# Patient Record
Sex: MALE | Race: BLACK OR AFRICAN AMERICAN | NOT HISPANIC OR LATINO | URBAN - NONMETROPOLITAN AREA
[De-identification: names, ages, dates, MRNs, and addresses within clinical notes are randomized per-mention and may not be internally consistent; named-entity substitution may affect disease eponyms.]

---

## 2022-01-25 ENCOUNTER — NEW PATIENT (OUTPATIENT)
Dept: URBAN - NONMETROPOLITAN AREA CLINIC 1 | Facility: CLINIC | Age: 57
End: 2022-01-25

## 2022-01-25 DIAGNOSIS — H52.4: ICD-10-CM

## 2022-01-25 PROCEDURE — S0620 ROUTINE OPHTHALMOLOGICAL EXA: HCPCS

## 2022-01-25 ASSESSMENT — TONOMETRY
OD_IOP_MMHG: 15
OS_IOP_MMHG: 15

## 2022-01-25 ASSESSMENT — VISUAL ACUITY
OD_SC: 20/50-
OD_PH: 20/40
OS_SC: 20/50-2

## 2022-01-25 NOTE — PATIENT DISCUSSION
Discussed diagnosis in detail with patient's mother. Would like to bring patient back for testing. Continue to monitor.

## 2022-01-25 NOTE — PATIENT DISCUSSION
Discussed diagnosis in detail with patient, recommend cataract eval with Dr. Alfonso Flynn but would like to do BL GL testing first. Patient's mother agrees with plan.

## 2022-02-15 ENCOUNTER — FOLLOW UP (OUTPATIENT)
Dept: URBAN - NONMETROPOLITAN AREA CLINIC 1 | Facility: CLINIC | Age: 57
End: 2022-02-15

## 2022-02-15 DIAGNOSIS — H40.1234: ICD-10-CM

## 2022-02-15 PROCEDURE — 92133 CPTRZD OPH DX IMG PST SGM ON: CPT

## 2022-02-15 PROCEDURE — 99214 OFFICE O/P EST MOD 30 MIN: CPT

## 2022-02-15 PROCEDURE — 76514 ECHO EXAM OF EYE THICKNESS: CPT

## 2022-02-15 ASSESSMENT — VISUAL ACUITY
OS_SC: 20/40
OD_SC: 20/40

## 2022-02-15 ASSESSMENT — TONOMETRY
OD_IOP_MMHG: 16
OS_IOP_MMHG: 16

## 2022-02-15 ASSESSMENT — PACHYMETRY
OS_CT_UM: 507
OD_CT_UM: 497

## 2022-02-15 NOTE — PATIENT DISCUSSION
Discussed diagnosis in detail with patient, recommend cataract eval with Dr. Lilia Gonzalez but would like to do BL GL testing first. Patient's mother agrees with plan.

## 2022-02-15 NOTE — PATIENT DISCUSSION
Discussed findings in detail w/ pt today. Signs/symptoms associated with progression discussed, will start patient on Latanoprost QHS OU. RX sent to pharmacy. Continue to monitor.

## 2022-03-16 ENCOUNTER — CONSULTATION/EVALUATION (OUTPATIENT)
Dept: URBAN - NONMETROPOLITAN AREA CLINIC 1 | Facility: CLINIC | Age: 57
End: 2022-03-16

## 2022-03-16 DIAGNOSIS — H25.13: ICD-10-CM

## 2022-03-16 PROCEDURE — 99214 OFFICE O/P EST MOD 30 MIN: CPT

## 2022-03-16 ASSESSMENT — VISUAL ACUITY
OD_BAT: 20/40-1
OS_PH: 20/25-2
OS_BAT: 20/40-1
OD_SC: 20/30
OD_PAM: 20/70
OS_SC: 20/30-1
OD_PH: 20/25-2
OS_SC: 20/100
OD_SC: 20/100
OS_AM: 20/50

## 2022-03-16 ASSESSMENT — TONOMETRY
OD_IOP_MMHG: 16
OS_IOP_MMHG: 16

## 2022-03-16 NOTE — PATIENT DISCUSSION
(Surgical) Visually Significant (secondary to glare), discussed the risks, benefits, alternatives, and limitations of cataract surgery. The patient stated a full understanding and a desire to proceed with the procedure. The patient will need to return for pre-op appointment with cataract measurements and to have any additional questions answered, and start pre-operative eye drops as directed. Pt elects to proceed with Stand/Trad OU starting with OD first and then OS after. Dextenza OU + Discussed need for bifocals.

## 2022-03-16 NOTE — PATIENT DISCUSSION
Discussed diagnosis in detail with patient, recommend cataract eval with Dr. Batool Ricardo but would like to do BL GL testing first. Patient's mother agrees with plan.

## 2022-04-20 ENCOUNTER — PRE-OP/H&P (OUTPATIENT)
Dept: URBAN - NONMETROPOLITAN AREA CLINIC 1 | Facility: CLINIC | Age: 57
End: 2022-04-20

## 2022-04-20 VITALS
HEART RATE: 72 BPM | DIASTOLIC BLOOD PRESSURE: 94 MMHG | WEIGHT: 185 LBS | BODY MASS INDEX: 30.82 KG/M2 | SYSTOLIC BLOOD PRESSURE: 127 MMHG | HEIGHT: 65 IN

## 2022-04-20 DIAGNOSIS — Z01.818: ICD-10-CM

## 2022-04-20 PROCEDURE — 99499 UNLISTED E&M SERVICE: CPT

## 2022-04-20 ASSESSMENT — VISUAL ACUITY
OD_BAT: 20/40-1
OD_SC: 20/30
OD_PH: 20/25-2
OS_BAT: 20/40-1
OS_SC: 20/100
OD_SC: 20/100
OS_PH: 20/25-2
OD_PAM: 20/70
OS_SC: 20/30-1
OS_AM: 20/50

## 2022-04-20 NOTE — PATIENT DISCUSSION
Medical Clearance done today. No outstanding medical concerns that would preclude surgery. Patient is cleared for surgery.

## 2022-05-10 ENCOUNTER — POST-OP (OUTPATIENT)
Dept: URBAN - NONMETROPOLITAN AREA CLINIC 1 | Facility: CLINIC | Age: 57
End: 2022-05-10

## 2022-05-10 ENCOUNTER — SURGERY/PROCEDURE (OUTPATIENT)
Dept: URBAN - NONMETROPOLITAN AREA CLINIC 2 | Facility: CLINIC | Age: 57
End: 2022-05-10

## 2022-05-10 DIAGNOSIS — Z98.41: ICD-10-CM

## 2022-05-10 DIAGNOSIS — H25.11: ICD-10-CM

## 2022-05-10 PROCEDURE — 66984 XCAPSL CTRC RMVL W/O ECP: CPT

## 2022-05-10 PROCEDURE — 99024 POSTOP FOLLOW-UP VISIT: CPT

## 2022-05-10 ASSESSMENT — VISUAL ACUITY
OD_SC: 20/40-2
OS_AM: 20/50
OS_BAT: 20/50
OS_SC: 20/30-1
OS_CC: 20/100

## 2022-05-10 NOTE — PATIENT DISCUSSION
(Surgical) Visually Significant (secondary to glare), discussed the risks, benefits, alternatives, and limitations of cataract surgery. The patient stated a full understanding and a desire to proceed with the procedure. The patient will need to return for pre-op appointment with cataract measurements and to have any additional questions answered, and start pre-operative eye drops as directed. A/S.

## 2022-05-17 ENCOUNTER — SURGERY/PROCEDURE (OUTPATIENT)
Dept: URBAN - NONMETROPOLITAN AREA CLINIC 2 | Facility: CLINIC | Age: 57
End: 2022-05-17

## 2022-05-17 DIAGNOSIS — H25.12: ICD-10-CM

## 2022-05-17 PROCEDURE — 66984 XCAPSL CTRC RMVL W/O ECP: CPT

## 2022-05-18 ENCOUNTER — POST-OP (OUTPATIENT)
Dept: URBAN - NONMETROPOLITAN AREA CLINIC 1 | Facility: CLINIC | Age: 57
End: 2022-05-18

## 2022-05-18 DIAGNOSIS — Z98.42: ICD-10-CM

## 2022-05-18 PROCEDURE — 99024 POSTOP FOLLOW-UP VISIT: CPT

## 2022-05-18 ASSESSMENT — VISUAL ACUITY
OD_SC: 20/25
OS_SC: 20/30-1

## 2022-05-18 ASSESSMENT — TONOMETRY
OS_IOP_MMHG: 16
OD_IOP_MMHG: 16

## 2022-05-24 ENCOUNTER — POST-OP (OUTPATIENT)
Dept: URBAN - NONMETROPOLITAN AREA CLINIC 1 | Facility: CLINIC | Age: 57
End: 2022-05-24

## 2022-05-24 DIAGNOSIS — Z98.42: ICD-10-CM

## 2022-05-24 DIAGNOSIS — Z98.41: ICD-10-CM

## 2022-05-24 PROCEDURE — 99024 POSTOP FOLLOW-UP VISIT: CPT

## 2022-05-24 ASSESSMENT — TONOMETRY
OS_IOP_MMHG: 15
OD_IOP_MMHG: 15

## 2022-05-24 ASSESSMENT — VISUAL ACUITY
OS_SC: 20/25
OD_SC: 20/25+2

## 2022-08-25 ENCOUNTER — POST-OP (OUTPATIENT)
Dept: URBAN - NONMETROPOLITAN AREA CLINIC 1 | Facility: CLINIC | Age: 57
End: 2022-08-25

## 2022-08-25 DIAGNOSIS — Z96.1: ICD-10-CM

## 2022-08-25 DIAGNOSIS — H52.4: ICD-10-CM

## 2022-08-25 PROCEDURE — 92014 COMPRE OPH EXAM EST PT 1/>: CPT

## 2022-08-25 PROCEDURE — 92015 DETERMINE REFRACTIVE STATE: CPT

## 2022-08-25 ASSESSMENT — VISUAL ACUITY
OS_SC: 20/25
OD_SC: 20/30-1

## 2022-08-25 ASSESSMENT — TONOMETRY
OD_IOP_MMHG: 16
OS_IOP_MMHG: 16

## 2023-05-26 ENCOUNTER — COMPREHENSIVE EXAM (OUTPATIENT)
Dept: URBAN - NONMETROPOLITAN AREA CLINIC 1 | Facility: CLINIC | Age: 58
End: 2023-05-26

## 2023-05-26 DIAGNOSIS — H26.493: ICD-10-CM

## 2023-05-26 DIAGNOSIS — H40.023: ICD-10-CM

## 2023-05-26 DIAGNOSIS — Z96.1: ICD-10-CM

## 2023-05-26 PROCEDURE — 99213 OFFICE O/P EST LOW 20 MIN: CPT

## 2023-05-26 ASSESSMENT — VISUAL ACUITY
OS_SC: 20/25
OD_SC: 20/25

## 2023-05-26 ASSESSMENT — TONOMETRY
OD_IOP_MMHG: 16
OS_IOP_MMHG: 15

## 2023-10-30 ENCOUNTER — FOLLOW UP (OUTPATIENT)
Dept: URBAN - NONMETROPOLITAN AREA CLINIC 1 | Facility: CLINIC | Age: 58
End: 2023-10-30

## 2023-10-30 DIAGNOSIS — H40.1131: ICD-10-CM

## 2023-10-30 PROCEDURE — 92083 EXTENDED VISUAL FIELD XM: CPT

## 2023-10-30 PROCEDURE — 92133 CPTRZD OPH DX IMG PST SGM ON: CPT

## 2023-10-30 PROCEDURE — 99214 OFFICE O/P EST MOD 30 MIN: CPT

## 2023-10-30 RX ORDER — LATANOPROST 50 UG/ML: 1 SOLUTION/ DROPS OPHTHALMIC EVERY EVENING

## 2023-10-30 ASSESSMENT — VISUAL ACUITY
OS_SC: 20/40
OD_SC: 20/50
OU_SC: 20/25-1
OD_PH: 20/40+1
OS_PH: 20/30

## 2023-10-30 ASSESSMENT — TONOMETRY
OD_IOP_MMHG: 16
OS_IOP_MMHG: 16

## 2023-12-04 ENCOUNTER — FOLLOW UP (OUTPATIENT)
Dept: URBAN - NONMETROPOLITAN AREA CLINIC 1 | Facility: CLINIC | Age: 58
End: 2023-12-04

## 2023-12-04 DIAGNOSIS — H40.1131: ICD-10-CM

## 2023-12-04 PROCEDURE — 92250 FUNDUS PHOTOGRAPHY W/I&R: CPT

## 2023-12-04 PROCEDURE — 99213 OFFICE O/P EST LOW 20 MIN: CPT

## 2023-12-04 ASSESSMENT — TONOMETRY
OS_IOP_MMHG: 12
OD_IOP_MMHG: 11

## 2023-12-04 ASSESSMENT — VISUAL ACUITY
OD_SC: 20/32-2
OU_SC: 20/25
OS_SC: 20/25-2

## 2024-06-06 ENCOUNTER — FOLLOW UP (OUTPATIENT)
Dept: URBAN - NONMETROPOLITAN AREA CLINIC 1 | Facility: CLINIC | Age: 59
End: 2024-06-06

## 2024-06-06 DIAGNOSIS — H40.1131: ICD-10-CM

## 2024-06-06 PROCEDURE — 92133 CPTRZD OPH DX IMG PST SGM ON: CPT

## 2024-06-06 PROCEDURE — 99213 OFFICE O/P EST LOW 20 MIN: CPT

## 2024-06-06 ASSESSMENT — VISUAL ACUITY
OD_SC: 20/29+
OS_SC: 20/29+
OU_SC: 20/29+

## 2024-06-06 ASSESSMENT — TONOMETRY
OS_IOP_MMHG: 14
OD_IOP_MMHG: 14

## 2024-12-13 ENCOUNTER — COMPREHENSIVE EXAM (OUTPATIENT)
Age: 59
End: 2024-12-13

## 2024-12-13 DIAGNOSIS — H52.4: ICD-10-CM

## 2024-12-13 PROCEDURE — S0621 ROUTINE OPHTHALMOLOGICAL EXA: HCPCS

## 2025-06-20 ENCOUNTER — FOLLOW UP (OUTPATIENT)
Age: 60
End: 2025-06-20

## 2025-06-20 DIAGNOSIS — H40.1131: ICD-10-CM

## 2025-06-20 PROCEDURE — 99213 OFFICE O/P EST LOW 20 MIN: CPT

## 2025-06-20 PROCEDURE — 92133 CPTRZD OPH DX IMG PST SGM ON: CPT
